# Patient Record
Sex: MALE | URBAN - METROPOLITAN AREA
[De-identification: names, ages, dates, MRNs, and addresses within clinical notes are randomized per-mention and may not be internally consistent; named-entity substitution may affect disease eponyms.]

---

## 2023-05-23 ENCOUNTER — OFFICE VISIT (OUTPATIENT)
Dept: URBAN - METROPOLITAN AREA CLINIC 96 | Facility: CLINIC | Age: 68
End: 2023-05-23
Payer: COMMERCIAL

## 2023-05-23 ENCOUNTER — LAB OUTSIDE AN ENCOUNTER (OUTPATIENT)
Dept: URBAN - METROPOLITAN AREA CLINIC 96 | Facility: CLINIC | Age: 68
End: 2023-05-23

## 2023-05-23 ENCOUNTER — DASHBOARD ENCOUNTERS (OUTPATIENT)
Age: 68
End: 2023-05-23

## 2023-05-23 VITALS
HEIGHT: 69 IN | HEART RATE: 65 BPM | BODY MASS INDEX: 29.89 KG/M2 | TEMPERATURE: 96.3 F | DIASTOLIC BLOOD PRESSURE: 83 MMHG | WEIGHT: 201.8 LBS | SYSTOLIC BLOOD PRESSURE: 146 MMHG

## 2023-05-23 DIAGNOSIS — Z83.79 FAMILY HISTORY OF CROHN'S DISEASE: ICD-10-CM

## 2023-05-23 DIAGNOSIS — R10.32 LLQ PAIN: ICD-10-CM

## 2023-05-23 PROCEDURE — 99204 OFFICE O/P NEW MOD 45 MIN: CPT | Performed by: INTERNAL MEDICINE

## 2023-06-01 ENCOUNTER — TELEPHONE ENCOUNTER (OUTPATIENT)
Dept: URBAN - METROPOLITAN AREA CLINIC 95 | Facility: CLINIC | Age: 68
End: 2023-06-01

## 2023-11-08 NOTE — HPI-TODAY'S VISIT:
Patient presents self referred.  68 yo male self referred for abdominal pain. One year ago had LLQ which resolved after one week. Then had recurrent 1 week ago LLQ, constant. Overall improving. No f/c, no change in BM. No n/v. No unintentional weight loss, no rectal bleeding.   Denies any prior diverticulitis.   No prior colonoscopy. No prior Cologuard. No prior CT imaging.   No family history of colon cancer, mother with Crohns disease.   Denies any prior colonoscopy. No changes in bowel habits, no rectal bleeding, no abdominal pain, no unintentional weight loss. No family history of colon polyps or colon cancer, no history of anesthesia problems. Up to date with primary MD. 80